# Patient Record
Sex: FEMALE | Race: WHITE | Employment: FULL TIME | URBAN - METROPOLITAN AREA
[De-identification: names, ages, dates, MRNs, and addresses within clinical notes are randomized per-mention and may not be internally consistent; named-entity substitution may affect disease eponyms.]

---

## 2019-08-13 ENCOUNTER — HOSPITAL ENCOUNTER (OUTPATIENT)
Dept: GENERAL RADIOLOGY | Age: 49
Discharge: HOME | End: 2019-08-13
Payer: COMMERCIAL

## 2019-08-13 DIAGNOSIS — M25.551 RIGHT HIP PAIN: ICD-10-CM

## 2019-08-13 PROCEDURE — 73502 X-RAY EXAM HIP UNI 2-3 VIEWS: CPT

## 2019-08-26 ENCOUNTER — HOSPITAL ENCOUNTER (OUTPATIENT)
Dept: PHYSICAL THERAPY | Age: 49
Discharge: HOME | End: 2019-08-26
Payer: COMMERCIAL

## 2019-08-26 PROCEDURE — 97161 PT EVAL LOW COMPLEX 20 MIN: CPT

## 2019-08-26 PROCEDURE — 97110 THERAPEUTIC EXERCISES: CPT

## 2019-08-26 NOTE — PROGRESS NOTES
PT DAILY TREATMENT NOTE 10-18    Patient Name: Eulalia Garsia  Date:2019  : 1970  [x]  Patient  Verified  Payor: BLUE CROSS / Plan: Yonathan Ojeda [id number] PPO / Product Type: PPO /    In time:8:16  Out time:8:49  Total Treatment Time (min): 33  Visit #: 1 of 8    Medicare/BCBS Only   Total Timed Codes (min):  16 1:1 Treatment Time:  33       Treatment Area: Pain in right hip [M25.551]    SUBJECTIVE  Pain Level (0-10 scale): 0  Any medication changes, allergies to medications, adverse drug reactions, diagnosis change, or new procedure performed?: [x] No    [] Yes (see summary sheet for update)  Subjective functional status/changes:   [] No changes reported  Pt reports intermittent right groin pain mostly with crossing legs or donning shoes    OBJECTIVE    Modality rationale: PD to improve the patients ability to    Min Type Additional Details    [] Estim:  []Unatt       []IFC  []Premod                        []Other:  []w/ice   []w/heat  Position:  Location:    [] Estim: []Att    []TENS instruct  []NMES                    []Other:  []w/US   []w/ice   []w/heat  Position:  Location:    []  Traction: [] Cervical       []Lumbar                       [] Prone          []Supine                       []Intermittent   []Continuous Lbs:  [] before manual  [] after manual    []  Ultrasound: []Continuous   [] Pulsed                           []1MHz   []3MHz W/cm2:  Location:    []  Iontophoresis with dexamethasone         Location: [] Take home patch   [] In clinic    []  Ice     []  heat  []  Ice massage  []  Laser   []  Anodyne Position:  Location:    []  Laser with stim  []  Other:  Position:  Location:    []  Vasopneumatic Device Pressure:       [] lo [] med [] hi   Temperature: [] lo [] med [] hi   [] Skin assessment post-treatment:  []intact []redness- no adverse reaction    []redness  adverse reaction:     17 min [x]Eval                  []Re-Eval       8 min Therapeutic Exercise:  [] See flow sheet : Rationale: increase ROM to improve the patients ability to perform daily tasks and self care    8 min Therapeutic Activity:  []  See flow sheet :   Rationale: self care instruction and pt education  to improve the patients ability to self manage symptoms            With   [] TE   [] TA   [] neuro   [] other: Patient Education: [x] Review HEP    [] Progressed/Changed HEP based on:   [] positioning   [] body mechanics   [] transfers   [] heat/ice application    [] other:      Other Objective/Functional Measures:      Pain Level (0-10 scale) post treatment: 1    ASSESSMENT/Changes in Function: see POC    Patient will continue to benefit from skilled PT services to modify and progress therapeutic interventions, address functional mobility deficits, address ROM deficits, address strength deficits, analyze and address soft tissue restrictions, analyze and cue movement patterns and analyze and modify body mechanics/ergonomics to attain remaining goals. [x]  See Plan of Care  []  See progress note/recertification  []  See Discharge Summary         Progress towards goals / Updated goals:  Short Term Goals: To be accomplished in 2 weeks:  1. Pt will be I and compliant with HEP to maximize therapeutic effect. 2. Pt will improve right hip flexion PROM to 105 deg without groin pain to improve ease of self care. Long Term Goals: To be accomplished in 4 weeks:  1. Pt will improve right hip ER to 50 deg in prone for improved joint mobility and ADL ease. 2. Pt will improve right hip IR to 25 deg in prone without groin pain for improved mobility with ADLs. 3. Pt will demonstrate (-) dynamic scour of right hip to demonstrate improved joint capsule mobility and reduced articular irritation with self care. 4. Pt will report ability to don socks and shoes without difficulty for improved function.     PLAN  []  Upgrade activities as tolerated     [x]  Continue plan of care  []  Update interventions per flow sheet       [] Discharge due to:_  []  Other:_      Ian Sanchez DPT, CMTPT 8/26/2019  9:37 AM    Future Appointments   Date Time Provider Vanda Adrianna   8/28/2019  6:00 PM [facility]   9/3/2019  4:00 PM [facility]   9/5/2019  4:00 PM [facility]   9/7/2019  8:15 AM [facility]   9/10/2019  3:30 PM [facility]   9/13/2019  4:30 PM [facility]   9/16/2019  4:00 PM Vannessa Granados, PT [facility]   9/18/2019  4:00 PM [facility]

## 2019-08-28 ENCOUNTER — HOSPITAL ENCOUNTER (OUTPATIENT)
Dept: PHYSICAL THERAPY | Age: 49
Discharge: HOME | End: 2019-08-28
Payer: COMMERCIAL

## 2019-08-28 PROCEDURE — 97140 MANUAL THERAPY 1/> REGIONS: CPT

## 2019-08-28 PROCEDURE — 97110 THERAPEUTIC EXERCISES: CPT

## 2019-08-28 NOTE — PROGRESS NOTES
PT DAILY TREATMENT NOTE 10-18    Patient Name: Catrachito Sands  Date:2019  : 1970  [x]  Patient  Verified  Payor: BLUE [employer] / Plan: Yonathan Ojeda [id number] PPO / Product Type: PPO /    In time:6:00  Out time:6:27  Total Treatment Time (min): 27  Visit #: 2 of 8    Medicare/BCBS Only   Total Timed Codes (min):  27 1:1 Treatment Time:  27       Treatment Area: Pain in right hip [M25.551]    SUBJECTIVE  Pain Level (0-10 scale): 0/10  Any medication changes, allergies to medications, adverse drug reactions, diagnosis change, or new procedure performed?: [x] No    [] Yes (see summary sheet for update)  Subjective functional status/changes:   [] No changes reported  Pt reports no new complaints of pain. Pt reports compliance with HEP. OBJECTIVE    19 min Therapeutic Exercise:  [x] See flow sheet :   Rationale: increase ROM and increase strength to improve the patients ability to tolerate ADLs. 8 min Manual Therapy:  Inferior/lateral right hip mobs, DTM to right glutes/piriformis   Rationale: decrease pain, increase ROM and increase tissue extensibility to improve functional mobility. With   [] TE   [] TA   [] neuro   [] other: Patient Education: [x] Review HEP    [] Progressed/Changed HEP based on:   [] positioning   [] body mechanics   [] transfers   [] heat/ice application    [] other:      Other Objective/Functional Measures: initiated exercises as per flow sheet. Pain Level (0-10 scale) post treatment: 0/10    ASSESSMENT/Changes in Function: Pt has no adverse reaction to treatment. Pt has no increased c/o pain. Pt continues to demonstrate good gait mechanics and has good available hip mobility. Patient will continue to benefit from skilled PT services to modify and progress therapeutic interventions, address functional mobility deficits, address ROM deficits, address strength deficits and analyze and address soft tissue restrictions to attain remaining goals.      []  See Plan of Care  []  See progress note/recertification  []  See Discharge Summary         Progress towards goals / Updated goals:  Short Term Goals: To be accomplished in 2 weeks:  1. Pt will be I and compliant with HEP to maximize therapeutic effect. 2. Pt will improve right hip flexion PROM to 105 deg without groin pain to improve ease of self care. Long Term Goals: To be accomplished in 4 weeks:  1. Pt will improve right hip ER to 50 deg in prone for improved joint mobility and ADL ease. 2. Pt will improve right hip IR to 25 deg in prone without groin pain for improved mobility with ADLs. 3. Pt will demonstrate (-) dynamic scour of right hip to demonstrate improved joint capsule mobility and reduced articular irritation with self care.   4. Pt will report ability to don socks and shoes without difficulty for improved function.       PLAN  []  Upgrade activities as tolerated     [x]  Continue plan of care  []  Update interventions per flow sheet       []  Discharge due to:_  []  Other:_      Gallito Carranza PTA 8/28/2019  6:30 PM    Future Appointments   Date Time Provider Vanda Rodas   9/3/2019  4:00 PM [facility]   9/5/2019  4:00 PM [place], [employer] [facility]   9/7/2019  8:15 AM HBV [facility]O ARMSTRONG  [facility]   9/10/2019  3:30 PM [place], [address] HBV   9/13/2019  4:30 PM Ehsan, [employer] [facility]   9/16/2019  4:00 PM Leticiae Ache, [place] [facility] [place]   9/18/2019  4:00 PM [facility]

## 2019-09-03 ENCOUNTER — HOSPITAL ENCOUNTER (OUTPATIENT)
Dept: PHYSICAL THERAPY | Age: 49
Discharge: HOME | End: 2019-09-03
Payer: COMMERCIAL

## 2019-09-03 PROCEDURE — 97110 THERAPEUTIC EXERCISES: CPT

## 2019-09-03 PROCEDURE — 97140 MANUAL THERAPY 1/> REGIONS: CPT

## 2019-09-03 NOTE — PROGRESS NOTES
PT DAILY TREATMENT NOTE 10-18    Patient Name: Lesa Mackenzie  Date:9/3/2019  : 1970  [x]  Patient  Verified  Payor: BLUE CROSS / Plan: Yonathan Ojeda [id number] PPO / Product Type: PPO /    In time:3:53  Out time:4:16  Total Treatment Time (min): 23  Visit #: 3 of 8    Medicare/BCBS Only   Total Timed Codes (min):  23 1:1 Treatment Time:  23       Treatment Area: Pain in right hip [M25.551]    SUBJECTIVE  Pain Level (0-10 scale): 0/10  Any medication changes, allergies to medications, adverse drug reactions, diagnosis change, or new procedure performed?: [x] No    [] Yes (see summary sheet for update)  Subjective functional status/changes:   [] No changes reported  Pt reports her hip is feeling much better. OBJECTIVE    15 min Therapeutic Exercise:  [x] See flow sheet :   Rationale: increase ROM and increase strength to improve the patients ability to tolerate ADLs. 8 min Manual Therapy:  Right hip inferior and lateral mobs   Rationale: decrease pain, increase ROM and increase tissue extensibility to tolerate ADLs. With   [] TE   [] TA   [] neuro   [] other: Patient Education: [x] Review HEP    [] Progressed/Changed HEP based on:   [] positioning   [] body mechanics   [] transfers   [] heat/ice application    [] other:      Other Objective/Functional Measures: Improved right hip IR ER mobility post manual treatment. Pain Level (0-10 scale) post treatment: 0/10    ASSESSMENT/Changes in Function: Pt has no adverse reaction to treatment. Pt demonstrates improved hip mobility in all directions. Patient will continue to benefit from skilled PT services to modify and progress therapeutic interventions, address functional mobility deficits, address ROM deficits, address strength deficits, analyze and address soft tissue restrictions, analyze and cue movement patterns and analyze and modify body mechanics/ergonomics to attain remaining goals.      []  See Plan of Care  []  See progress note/recertification  []  See Discharge Summary         Progress towards goals / Updated goals:  Short Term Goals: To be accomplished in 2 weeks:  1. Pt will be I and compliant with HEP to maximize therapeutic effect. - Met per patient report. 9/3/19  2. Pt will improve right hip flexion PROM to 105 deg without groin pain to improve ease of self care. Long Term Goals: To be accomplished in 4 weeks:  1. Pt will improve right hip ER to 50 deg in prone for improved joint mobility and ADL ease. 2. Pt will improve right hip IR to 25 deg in prone without groin pain for improved mobility with ADLs. 3. Pt will demonstrate (-) dynamic scour of right hip to demonstrate improved joint capsule mobility and reduced articular irritation with self care. 4. Pt will report ability to don socks and shoes without difficulty for improved function.     PLAN  []  Upgrade activities as tolerated     [x]  Continue plan of care  []  Update interventions per flow sheet       []  Discharge due to:_  []  Other:_      Gela Garcia PTA 9/3/2019  3:56 PM    Future Appointments   Date Time Provider Vanda Rodas   9/3/2019  4:00 PM Tony Sharma PTA [facility]   9/5/2019  4:00 PM Ehsan, [address] HBV   9/7/2019  8:15 AM HBV ZHAO ARMSTRONG  [facility]   9/10/2019  3:30 PM Roseann Ward [facility]   9/13/2019  4:30 PM Roseann Ward [facility]   9/16/2019  4:00 PM Jhoana Brown PT [facility]   9/18/2019  4:00 PM Tony Sharma PTA [facility]

## 2019-09-05 ENCOUNTER — HOSPITAL ENCOUNTER (OUTPATIENT)
Dept: PHYSICAL THERAPY | Age: 49
Discharge: HOME | End: 2019-09-05
Payer: COMMERCIAL

## 2019-09-05 PROCEDURE — 97140 MANUAL THERAPY 1/> REGIONS: CPT

## 2019-09-05 PROCEDURE — 97110 THERAPEUTIC EXERCISES: CPT

## 2019-09-05 NOTE — PROGRESS NOTES
PT DAILY TREATMENT NOTE 10-18    Patient Name: Wadell Nageotte  Date:2019  : 1970  [x]  Patient  Verified  Payor: BLUE CROSS / Plan: [facility] PPO / Product Type: PPO /    In time:4:00  Out time:4:34  Total Treatment Time (min): 34  Visit #: 4 of 8    Medicare/BCBS Only   Total Timed Codes (min):  34 1:1 Treatment Time:  29       Treatment Area: Pain in right hip [M25.551]    SUBJECTIVE  Pain Level (0-10 scale): 0  Any medication changes, allergies to medications, adverse drug reactions, diagnosis change, or new procedure performed?: [x] No    [] Yes (see summary sheet for update)  Subjective functional status/changes:   [] No changes reported  \"I can tell its getting better.  \" pt reports less intense groin pain and more anterolateral region discomfort     OBJECTIVE    Modality rationale: PD to improve the patients ability to    Min Type Additional Details    [] Estim:  []Unatt       []IFC  []Premod                        []Other:  []w/ice   []w/heat  Position:  Location:    [] Estim: []Att    []TENS instruct  []NMES                    []Other:  []w/US   []w/ice   []w/heat  Position:  Location:    []  Traction: [] Cervical       []Lumbar                       [] Prone          []Supine                       []Intermittent   []Continuous Lbs:  [] before manual  [] after manual    []  Ultrasound: []Continuous   [] Pulsed                           []1MHz   []3MHz W/cm2:  Location:    []  Iontophoresis with dexamethasone         Location: [] Take home patch   [] In clinic    []  Ice     []  heat  []  Ice massage  []  Laser   []  Anodyne Position:  Location:    []  Laser with stim  []  Other:  Position:  Location:    []  Vasopneumatic Device Pressure:       [] lo [] med [] hi   Temperature: [] lo [] med [] hi   [] Skin assessment post-treatment:  []intact []redness- no adverse reaction    []redness  adverse reaction:     26 min Therapeutic Exercise:  [] See flow sheet :   Rationale: increase ROM and increase strength to improve the patients ability to perform daily tasks and ADLs     8 min Manual Therapy:  Right hip inf/lateral mobs in neutral and slight ER   Rationale: increase ROM and increase tissue extensibility to improve ease of self care        With   [] TE   [] TA   [] neuro   [] other: Patient Education: [x] Review HEP    [] Progressed/Changed HEP based on:   [] positioning   [] body mechanics   [] transfers   [] heat/ice application    [] other:      Other Objective/Functional Measures: added s/l clams and lateral band walks today. Pt reports no pain at end of session but some post exercise soreness through glutes   Pain Level (0-10 scale) post treatment: 0    ASSESSMENT/Changes in Function: Pt still limited mainly with hip ER at this time with referral of pain into groin. She does feel that her pain is improving and maybe a bit less intense through the groin. Progress with hip ER mobility and functional task performance    Patient will continue to benefit from skilled PT services to modify and progress therapeutic interventions, address functional mobility deficits, address ROM deficits, address strength deficits, analyze and address soft tissue restrictions, analyze and cue movement patterns and analyze and modify body mechanics/ergonomics to attain remaining goals. []  See Plan of Care  []  See progress note/recertification  []  See Discharge Summary         Progress towards goals / Updated goals:  Short Term Goals: To be accomplished in 2 weeks:  1. Pt will be I and compliant with HEP to maximize therapeutic effect. - Met per patient report. 9/3/19  2. Pt will improve right hip flexion PROM to 105 deg without groin pain to improve ease of self care. Long Term Goals: To be accomplished in 4 weeks:  1. Pt will improve right hip ER to 50 deg in prone for improved joint mobility and ADL ease. Progressing slowly- 27 deg 9/5/19  2.  Pt will improve right hip IR to 25 deg in prone without groin pain for improved mobility with ADLs. Met- 55 deg 9/5/19  3. Pt will demonstrate (-) dynamic scour of right hip to demonstrate improved joint capsule mobility and reduced articular irritation with self care. 4. Pt will report ability to don socks and shoes without difficulty for improved function.     PLAN  [x]  Upgrade activities as tolerated     []  Continue plan of care  []  Update interventions per flow sheet       []  Discharge due to:_  []  Other:_      Elizabeth Marcelo DPT, CMTPT 9/5/2019  4:20 PM    Future Appointments   Date Time Provider Vanda Rodas   9/7/2019  8:15 AM HBV [facility]O RM [address] [facility]   9/10/2019  3:30 PM Ajay Ward [facility]   9/13/2019  4:30 PM Ajay Ward [facility]   9/16/2019  4:00 PM Juliann Woody, PT [facility]   9/18/2019  4:00 PM Meli Carrillo PTA [facility]

## 2019-09-07 ENCOUNTER — HOSPITAL ENCOUNTER (OUTPATIENT)
Dept: MAMMOGRAPHY | Age: 49
Discharge: HOME | End: 2019-09-07
Attending: FAMILY MEDICINE
Payer: COMMERCIAL

## 2019-09-07 DIAGNOSIS — Z12.39 SCREENING FOR BREAST CANCER: ICD-10-CM

## 2019-09-07 PROCEDURE — 77067 SCR MAMMO BI INCL CAD: CPT

## 2019-09-10 ENCOUNTER — HOSPITAL ENCOUNTER (OUTPATIENT)
Dept: PHYSICAL THERAPY | Age: 49
Discharge: HOME | End: 2019-09-10
Payer: COMMERCIAL

## 2019-09-10 PROCEDURE — 97140 MANUAL THERAPY 1/> REGIONS: CPT

## 2019-09-10 PROCEDURE — 97110 THERAPEUTIC EXERCISES: CPT

## 2019-09-10 NOTE — PROGRESS NOTES
PT DAILY TREATMENT NOTE 10-18    Patient Name: Aaron Sierra  Date:9/10/2019  : 1970  [x]  Patient  Verified  Payor: BLUE [employer] / Plan: Yontahan Ojeda [id number] PPO / Product Type: PPO /    In time:3:39  Out time:4:12  Total Treatment Time (min): 33  Visit #: 5 of 8    Medicare/BCBS Only   Total Timed Codes (min):  33 1:1 Treatment Time:  29       Treatment Area: Pain in right hip [M25.551]    SUBJECTIVE  Pain Level (0-10 scale): 0  Any medication changes, allergies to medications, adverse drug reactions, diagnosis change, or new procedure performed?: [x] No    [] Yes (see summary sheet for update)  Subjective functional status/changes:   [] No changes reported  Pt reports she tried to sit [place] cross this weekend and did notice improved right hip mobility but still groin discomfort    OBJECTIVE    Modality rationale: PD to improve the patients ability to    Min Type Additional Details    [] Estim:  []Unatt       []IFC  []Premod                        []Other:  []w/ice   []w/heat  Position:  Location:    [] Estim: []Att    []TENS instruct  []NMES                    []Other:  []w/US   []w/ice   []w/heat  Position:  Location:    []  Traction: [] Cervical       []Lumbar                       [] Prone          []Supine                       []Intermittent   []Continuous Lbs:  [] before manual  [] after manual    []  Ultrasound: []Continuous   [] Pulsed                           []1MHz   []3MHz W/cm2:  Location:    []  Iontophoresis with dexamethasone         Location: [] Take home patch   [] In clinic    []  Ice     []  heat  []  Ice massage  []  Laser   []  Anodyne Position:  Location:    []  Laser with stim  []  Other:  Position:  Location:    []  Vasopneumatic Device Pressure:       [] lo [] med [] hi   Temperature: [] lo [] med [] hi   [] Skin assessment post-treatment:  []intact []redness- no adverse reaction    []redness  adverse reaction:       25 min Therapeutic Exercise:  [] See flow sheet : Rationale: increase ROM and increase strength to improve the patients ability to perform daily tasks and recreation      8 min Manual Therapy:  Right hip lateral distraction in neutral and ER, IR mobs with mulligan strap   Rationale: decrease pain and increase ROM to improve ease of ADL performance           With   [] TE   [] TA   [] neuro   [] other: Patient Education: [x] Review HEP    [] Progressed/Changed HEP based on:   [] positioning   [] body mechanics   [] transfers   [] heat/ice application    [] other:      Other Objective/Functional Measures: pt reports no issues with walking at this time     Pain Level (0-10 scale) post treatment: 0    ASSESSMENT/Changes in Function: Pt progressing gradually with hip ER mobility and hip flexion end range mobility. Still experiencing some groin pain at end ranges of these movements but much improved activity tolerance. Continue PT progress with end range hip mobility and posterior chain strength    Patient will continue to benefit from skilled PT services to modify and progress therapeutic interventions, address functional mobility deficits, address ROM deficits, address strength deficits, analyze and address soft tissue restrictions, analyze and cue movement patterns and analyze and modify body mechanics/ergonomics to attain remaining goals. []  See Plan of Care  []  See progress note/recertification  []  See Discharge Summary         Progress towards goals / Updated goals:  Short Term Goals: To be accomplished in 2 weeks:  1. Pt will be I and compliant with HEP to maximize therapeutic effect. - Met per patient report. 9/3/19  2. Pt will improve right hip flexion PROM to 105 deg without groin pain to improve ease of self care. Near met 101 deg 9/10/19  Long Term Goals: To be accomplished in 4 weeks:  1. Pt will improve right hip ER to 50 deg in prone for improved joint mobility and ADL ease. Progressing slowly- 27 deg 9/5/19  2.  Pt will improve right hip IR to 25 deg in prone without groin pain for improved mobility with ADLs. Met- 55 deg 9/5/19  3. Pt will demonstrate (-) dynamic scour of right hip to demonstrate improved joint capsule mobility and reduced articular irritation with self care. 4. Pt will report ability to don socks and shoes without difficulty for improved function.     PLAN  []  Upgrade activities as tolerated     []  Continue plan of care  []  Update interventions per flow sheet       []  Discharge due to:_  []  Other:_      Jose Luis Reynolds DPT, CMTPT 9/10/2019  3:45 PM    Future Appointments   Date Time Provider Vanda Saucedoi   9/13/2019  4:30 PM Ehsan [address] [facility]   9/16/2019  4:00 PM [facility]   9/18/2019  4:00 PM Luann Medina PTA [facility]

## 2019-09-13 ENCOUNTER — HOSPITAL ENCOUNTER (OUTPATIENT)
Dept: PHYSICAL THERAPY | Age: 49
Discharge: HOME | End: 2019-09-13
Payer: COMMERCIAL

## 2019-09-13 PROCEDURE — 97110 THERAPEUTIC EXERCISES: CPT

## 2019-09-13 PROCEDURE — 97140 MANUAL THERAPY 1/> REGIONS: CPT

## 2019-09-13 NOTE — PROGRESS NOTES
PT DAILY TREATMENT NOTE 10-18    Patient Name: Catrachito Sands  Date:2019  : 1970  [x]  Patient  Verified  Payor: BLUE CROSS / Plan: Yonathan Ojeda [id number] PPO / Product Type: PPO /    In time:4:37  Out time:5:11  Total Treatment Time (min): 34  Visit #: 6 of 8    Medicare/BCBS Only   Total Timed Codes (min):  34 1:1 Treatment Time:  25       Treatment Area: Pain in right hip [M25.551]    SUBJECTIVE  Pain Level (0-10 scale): 0  Any medication changes, allergies to medications, adverse drug reactions, diagnosis change, or new procedure performed?: [x] No    [] Yes (see summary sheet for update)  Subjective functional status/changes:   [] No changes reported  Pt reports she did feel some groin discomfort intermittently when walking about yesterday afternoon.  She reports she is pleased with the flexibility/ROM of her hip    OBJECTIVE    Modality rationale: PD to improve the patients ability to    Min Type Additional Details    [] Estim:  []Unatt       []IFC  []Premod                        []Other:  []w/ice   []w/heat  Position:  Location:    [] Estim: []Att    []TENS instruct  []NMES                    []Other:  []w/US   []w/ice   []w/heat  Position:  Location:    []  Traction: [] Cervical       []Lumbar                       [] Prone          []Supine                       []Intermittent   []Continuous Lbs:  [] before manual  [] after manual    []  Ultrasound: []Continuous   [] Pulsed                           []1MHz   []3MHz W/cm2:  Location:    []  Iontophoresis with dexamethasone         Location: [] Take home patch   [] In clinic    []  Ice     []  heat  []  Ice massage  []  Laser   []  Anodyne Position:  Location:    []  Laser with stim  []  Other:  Position:  Location:    []  Vasopneumatic Device Pressure:       [] lo [] med [] hi   Temperature: [] lo [] med [] hi   [] Skin assessment post-treatment:  []intact []redness- no adverse reaction    []redness  adverse reaction:       26 min Therapeutic Exercise:  [] See flow sheet :   Rationale: increase ROM and increase strength to improve the patients ability to perform daily tasks and work duties    8 min Manual Therapy:  Right hip inf mobs in neutral and ER, lateral distraction    Rationale: decrease pain, increase ROM and increase tissue extensibility to improve ease of ADLs and self care       With   [] TE   [] TA   [] neuro   [] other: Patient Education: [x] Review HEP    [] Progressed/Changed HEP based on:   [] positioning   [] body mechanics   [] transfers   [] heat/ice application    [] other:      Other Objective/Functional Measures: pt reports improved ease of crossing leg to don shoes     Pain Level (0-10 scale) post treatment: 0 \"sore\"    ASSESSMENT/Changes in Function: Pt appears to be objectively and subjectively improving in regards to right hip mobility. Still experiencing some right groin discomfort intermittently. Progress with flexibility and mobility work into strengthening    Patient will continue to benefit from skilled PT services to modify and progress therapeutic interventions, address functional mobility deficits, address ROM deficits, address strength deficits, analyze and address soft tissue restrictions, analyze and cue movement patterns and analyze and modify body mechanics/ergonomics to attain remaining goals. []  See Plan of Care  []  See progress note/recertification  []  See Discharge Summary         Progress towards goals / Updated goals:  Short Term Goals: To be accomplished in 2 weeks:  1. Pt will be I and compliant with HEP to maximize therapeutic effect. - Met per patient report. 9/3/19  2. Pt will improve right hip flexion PROM to 105 deg without groin pain to improve ease of self care. Near met 101 deg 9/10/19  Long Term Goals: To be accomplished in 4 weeks:  1. Pt will improve right hip ER to 50 deg in prone for improved joint mobility and ADL ease. Progressing slowly- 27 deg 9/5/19  2.  Pt will improve right hip IR to 25 deg in prone without groin pain for improved mobility with ADLs.  Met- 55 deg 9/5/19  3. Pt will demonstrate (-) dynamic scour of right hip to demonstrate improved joint capsule mobility and reduced articular irritation with self care. 4. Pt will report ability to don socks and shoes without difficulty for improved function.  Progressing- pt reports no longer needing UE assist 9/13/19    PLAN  []  Upgrade activities as tolerated     []  Continue plan of care  []  Update interventions per flow sheet       []  Discharge due to:_  []  Other:_      Marquis Villanueva DPT, CMTPT 9/13/2019  4:48 PM    Future Appointments   Date Time Provider Vanda Saucedoi   9/16/2019  4:00 PM [facility]   9/18/2019  4:00 PM [facility]

## 2019-09-16 ENCOUNTER — HOSPITAL ENCOUNTER (OUTPATIENT)
Dept: PHYSICAL THERAPY | Age: 49
Discharge: HOME | End: 2019-09-16
Payer: COMMERCIAL

## 2019-09-16 PROCEDURE — 97110 THERAPEUTIC EXERCISES: CPT

## 2019-09-16 PROCEDURE — 97140 MANUAL THERAPY 1/> REGIONS: CPT

## 2019-09-16 NOTE — PROGRESS NOTES
PT DAILY TREATMENT NOTE 10-18    Patient Name: Yonatan Tai  Date:2019  : 1970  [x]  Patient  Verified  Payor: BLUE CROSS / Plan: Yonathan Ojeda [id number] PPO / Product Type: PPO /    In time: 4:00    Out time: 4:34  Total Treatment Time (min): 34  Visit #: 7 of 8    Medicare/BCBS Only   Total Timed Codes (min):  34 1:1 Treatment Time: 29       Treatment Area: Pain in right hip [M25.551]    SUBJECTIVE  Pain Level (0-10 scale): 0  Any medication changes, allergies to medications, adverse drug reactions, diagnosis change, or new procedure performed?: [x] No    [] Yes (see summary sheet for update)  Subjective functional status/changes:   [] No changes reported  Reports no changes since the last session. OBJECTIVE    24 min Therapeutic Exercise:  [x] See flow sheet :   Rationale: increase ROM and increase strength to improve the patients ability to perform daily tasks and work duties    10 min Manual Therapy:  Right hip inferior/lateral grade 3-4 mobs, right LE distraction    Rationale: decrease pain, increase ROM and increase tissue extensibility to improve ease of ADLs and self care       With   [] TE   [] TA   [] neuro   [] other: Patient Education: [x] Review HEP    [] Progressed/Changed HEP based on:   [] positioning   [] body mechanics   [] transfers   [] heat/ice application    [] other:      Other Objective/Functional Measures: Increased reps per flow sheet to improve mobility and strength. Pain Level (0-10 scale) post treatment: 0    ASSESSMENT/Changes in Function: Reported no pain post session. Fatigue noted with lateral bandwalks with increased B hip ER noted with this exercise. Improvement in right hip ER AROM reported by pt during s/l right clams ER exercise. Pt continues to report improvements in mobility and ROM of the right hip. Continue POC as tolerated.     Patient will continue to benefit from skilled PT services to modify and progress therapeutic interventions, address functional mobility deficits, address ROM deficits, address strength deficits, analyze and address soft tissue restrictions, analyze and cue movement patterns and analyze and modify body mechanics/ergonomics to attain remaining goals. []  See Plan of Care  []  See progress note/recertification  []  See Discharge Summary         Progress towards goals / Updated goals:  Short Term Goals: To be accomplished in 2 weeks:  1. Pt will be I and compliant with HEP to maximize therapeutic effect. - Met per patient report. 9/3/19  2. Pt will improve right hip flexion PROM to 105 deg without groin pain to improve ease of self care. Near met 101 deg 9/10/19  Long Term Goals: To be accomplished in 4 weeks:  1. Pt will improve right hip ER to 50 deg in prone for improved joint mobility and ADL ease. Progressing slowly- 27 deg 9/5/19  2. Pt will improve right hip IR to 25 deg in prone without groin pain for improved mobility with ADLs.  Met- 55 deg 9/5/19  3. Pt will demonstrate (-) dynamic scour of right hip to demonstrate improved joint capsule mobility and reduced articular irritation with self care. 4. Pt will report ability to don socks and shoes without difficulty for improved function.  Progressing- pt reports no longer needing UE assist 9/13/19    PLAN  [x]  Upgrade activities as tolerated     [x]  Continue plan of care  [x]  Update interventions per flow sheet       []  Discharge due to:_  []  Other:_       Laura Stewart, PT  9/16/2019  4:04 PM    Future Appointments   Date Time Provider Vanda Rodas   9/18/2019  4:00 PM Gwynneth Homans, PTA [id number]

## 2019-09-18 ENCOUNTER — HOSPITAL ENCOUNTER (OUTPATIENT)
Dept: PHYSICAL THERAPY | Age: 49
Discharge: HOME | End: 2019-09-18
Payer: COMMERCIAL

## 2019-09-18 PROCEDURE — 97110 THERAPEUTIC EXERCISES: CPT

## 2019-09-18 PROCEDURE — 97112 NEUROMUSCULAR REEDUCATION: CPT

## 2019-09-18 NOTE — PROGRESS NOTES
PT DISCHARGE DAILY NOTE AND [id number]    Patient name: Maco Alanis Start of Care: 2019   Referral source: Sarthak Crain MD : 1970                Medical Diagnosis: Pain in right hip [M25.551]  Payor: RICKY SPAIN / Plan: Treinta Captual Rusty 5747 PPO / Product Type: PPO /  Onset Date:5-6 months                Treatment Diagnosis: Right hip pain   Prior Hospitalization: see medical history Provider#: [id number]   Medications: Verified on Patient summary List    Comorbidities: Cervical fusion    Prior Level of Function: Pt able to perform daily tasks and recreational walking without hip pain      Visits from Start of Care: 8    Missed Visits: 0    Reporting Period : 19 to 19    Date:2019  : 1970  [x]  Patient  Verified  Payor: Carmel Simpson / Plan: Treinta Y Rusty 5747 PPO / Product Type: PPO /    In time:4:00  Out time:4:28  Total Treatment Time (min): 28  Visit #: 8 of 8    Medicare/BCBS Only   Total Timed Codes (min):  28 1:1 Treatment Time:  28       SUBJECTIVE  Pain Level (0-10 scale):   0/10  Any medication changes, allergies to medications, adverse drug reactions, diagnosis change, or new procedure performed?: [x] No    [] Yes (see summary sheet for update)  Subjective functional status/changes:   [] No changes reported  Pt reports she is feeling really good and has improved functional mobility with ability to marybeth socks and cross her legs without difficulty or pain. OBJECTIVE    18 min Therapeutic Exercise:  [x] See flow sheet :   Rationale: increase ROM and increase strength to improve the patients ability to tolerate ADLs. 10 min Neuromuscular Re-education:  []  See flow sheet :   Rationale: increase strength, improve coordination and increase proprioception  to improve the patients ability to tolerate ADLs.            With   [] TE   [] TA   [] neuro   [] other: Patient Education: [x] Review HEP    [] Progressed/Changed HEP based on:   [] positioning [] body mechanics   [] transfers   [] heat/ice application    [] other:      Other Objective/Functional Measures: PROM hip flexion 110 degrees    FOTO     Pain Level (0-10 scale) post treatment: 0/10    Summary of Care:  Short Term Goals: To be accomplished in 2 weeks:  1. Pt will be I and compliant with HEP to maximize therapeutic effect. - Met per patient report. 9/3/19  2. Pt will improve right hip flexion PROM to 105 deg without groin pain to improve ease of self care. Met 110 degrees. 9/18/19  Long Term Goals: To be accomplished in 4 weeks:  1. Pt will improve right hip ER to 50 deg in prone for improved joint mobility and ADL ease. progressed to 32. 9/18/19  2. Pt will improve right hip IR to 25 deg in prone without groin pain for improved mobility with ADLs.  Met- 55 deg 9/5/19  3. Pt will demonstrate (-) dynamic scour of right hip to demonstrate improved joint capsule mobility and reduced articular irritation with self care. - Met. 9/18/19  4. Pt will report ability to don socks and shoes without difficulty for improved function. -Met per patient report. 9/18/19    ASSESSMENT/Changes in Function: Pt has made good progress or met all goals. Pt has returned to PLOF.      Thank you for this referral!      PLAN  [x]Discontinue therapy: [x]Patient has reached or is progressing toward set goals      []Patient is non-compliant or has abdicated      []Due to lack of appreciable progress towards set goals    Morris Anguiano PTA 9/18/2019  4:19 PM

## 2023-08-02 ENCOUNTER — HOSPITAL ENCOUNTER (OUTPATIENT)
Facility: HOSPITAL | Age: 53
Discharge: HOME | End: 2023-08-05
Payer: COMMERCIAL

## 2023-08-02 DIAGNOSIS — Z12.31 VISIT FOR SCREENING MAMMOGRAM: ICD-10-CM

## 2023-08-02 PROCEDURE — 77063 BREAST TOMOSYNTHESIS BI: CPT

## 2025-05-13 ENCOUNTER — TRANSCRIBE ORDERS (OUTPATIENT)
Facility: HOSPITAL | Age: 55
End: 2025-05-13

## 2025-05-13 DIAGNOSIS — Z12.31 ENCOUNTER FOR SCREENING MAMMOGRAM FOR MALIGNANT NEOPLASM OF BREAST: Primary | ICD-10-CM
